# Patient Record
Sex: MALE | Race: WHITE | NOT HISPANIC OR LATINO | Employment: FULL TIME | URBAN - METROPOLITAN AREA
[De-identification: names, ages, dates, MRNs, and addresses within clinical notes are randomized per-mention and may not be internally consistent; named-entity substitution may affect disease eponyms.]

---
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
## 2023-04-28 ENCOUNTER — HOSPITAL ENCOUNTER (EMERGENCY)
Facility: CLINIC | Age: 18
Discharge: HOME | End: 2023-04-28
Attending: EMERGENCY MEDICINE | Admitting: EMERGENCY MEDICINE
Payer: COMMERCIAL

## 2023-04-28 VITALS
TEMPERATURE: 97.9 F | OXYGEN SATURATION: 99 % | WEIGHT: 229 LBS | SYSTOLIC BLOOD PRESSURE: 150 MMHG | DIASTOLIC BLOOD PRESSURE: 77 MMHG | HEART RATE: 64 BPM | BODY MASS INDEX: 31.06 KG/M2 | RESPIRATION RATE: 18 BRPM

## 2023-04-28 DIAGNOSIS — K08.89 PAIN, DENTAL: ICD-10-CM

## 2023-04-28 PROCEDURE — 99284 EMERGENCY DEPT VISIT MOD MDM: CPT

## 2023-04-28 RX ORDER — IBUPROFEN 800 MG/1
800 TABLET, FILM COATED ORAL EVERY 8 HOURS PRN
Qty: 24 TABLET | Refills: 0 | Status: SHIPPED | OUTPATIENT
Start: 2023-04-28 | End: 2023-05-06

## 2023-04-28 RX ORDER — HYDROCODONE BITARTRATE AND ACETAMINOPHEN 5; 325 MG/1; MG/1
1 TABLET ORAL EVERY 6 HOURS PRN
Qty: 10 TABLET | Refills: 0 | Status: SHIPPED | OUTPATIENT
Start: 2023-04-28 | End: 2023-05-01

## 2023-04-28 RX ORDER — CLINDAMYCIN HCL 300 MG
300 CAPSULE ORAL 3 TIMES DAILY
Qty: 30 CAPSULE | Refills: 0 | Status: SHIPPED | OUTPATIENT
Start: 2023-04-28 | End: 2023-05-08

## 2023-04-28 NOTE — ED PROVIDER NOTES
History     Chief Complaint:  Dental Pain       HPI   Rafita Kramer is a 17 year old male with a history of wisdom teeth pain who presents with dental pain    Patient is a 17-year-old male with a history of wisdom teeth pain.  Patient is a seen by had patient to oral surgery and is scheduled initially for tooth extraction on 23 May.  Patient called them back and had worsening pain has an open area in the left lower mandible and was recommended we change in the date to May 3.  Patient continues to have pain is taking ibuprofen 400 every 12 hours as well as Tylenol pain has been difficult and was recommended to come to the emergency room for antibiotics.  .      Independent Historian:       Review of External Notes:      ROS:  Review of Systems    Allergies:  Cashew Nut Oil     Medications:    No current outpatient medications on file.      Past Medical History:    Past Medical History:   Diagnosis Date     Anaphylaxis        Past Surgical History:    No past surgical history on file.     Family History:    family history is not on file.    Social History:   reports that he has never smoked. He has been exposed to tobacco smoke. He has never used smokeless tobacco.  PCP: Rocky Stokes     Physical Exam     Patient Vitals for the past 24 hrs:   BP Temp Temp src Pulse Resp SpO2 Weight   04/28/23 1529 (!) 150/77 97.9  F (36.6  C) Temporal 64 18 99 % 103.9 kg (229 lb)        Physical Exam  Vitals reviewed.   HENT:      Head: Normocephalic.      Right Ear: Tympanic membrane normal.      Left Ear: Tympanic membrane normal.      Nose: Nose normal.      Mouth/Throat:      Mouth: Mucous membranes are moist.      Comments: Dentition: Patient has a open to carry in the base of the left molar.  There is no gingival abscess.  There is tenderness and tap tenderness over the left lower molar.  No facial swelling.  Neurological:      Mental Status: He is alert.           Emergency Department Course     Laboratory:  Labs  Ordered and Resulted from Time of ED Arrival to Time of ED Departure - No data to display         Emergency Department Course & Assessments:             Interventions:  Medications - No data to display       Social Determinants of Health affecting care:  none       Assessments:      Disposition:  The patient was discharged to home.     Impression & Plan        Medical Decision Making:  Patient is a well-appearing 17-year-old male with left lower dental pain.  Examination is consistent with Dental caries as well as dental wisdom tooth pain.  Did consider dental block but due to patient's scheduled appointment on the third lack of acute interventions in the emergency room offered empiric pain medication and antibiotics as a bridge to follow-up with oral surgery.  Patient was discharged home in stable condition.    Critical Care time:  was 0 minutes for this patient excluding procedures.    Diagnosis:    ICD-10-CM    1. Pain, dental  K08.89            Discharge Medications:  Discharge Medication List as of 4/28/2023  4:14 PM      START taking these medications    Details   clindamycin (CLEOCIN) 300 MG capsule Take 1 capsule (300 mg) by mouth 3 times daily for 10 days, Disp-30 capsule, R-0, Local Print      HYDROcodone-acetaminophen (NORCO) 5-325 MG tablet Take 1 tablet by mouth every 6 hours as needed for severe pain, Disp-10 tablet, R-0, Local Print      ibuprofen (ADVIL/MOTRIN) 800 MG tablet Take 1 tablet (800 mg) by mouth every 8 hours as needed for moderate pain, Disp-24 tablet, R-0, Local Print                Rocky Dobbs MD    4/28/2023   Rocky Dobbs MD Goodman, Brian Samuel, MD  04/29/23 1018

## 2023-04-28 NOTE — DISCHARGE INSTRUCTIONS
The emergency room is limited in its ability to help with dental pain.  Continue ibuprofen 800 mg every 6 hours.  Use pain medication when needed start clindamycin as antibiotic.  Please follow-up May 3 for dental extraction and wisdom teeth care through your oral surgeon.  Thanks for your patience today.

## 2023-04-28 NOTE — ED TRIAGE NOTES
Lower left wisdom tooth pain x3 weeks. Denies fevers, chills. Seen by dentist and told to come to ED for abx.      Triage Assessment     Row Name 04/28/23 0894       Triage Assessment (Adult)    Airway WDL WDL       Respiratory WDL    Respiratory WDL WDL       Skin Circulation/Temperature WDL    Skin Circulation/Temperature WDL WDL       Cardiac WDL    Cardiac WDL WDL       Peripheral/Neurovascular WDL    Peripheral Neurovascular WDL WDL       Cognitive/Neuro/Behavioral WDL    Cognitive/Neuro/Behavioral WDL WDL

## 2023-06-09 ENCOUNTER — OFFICE VISIT (OUTPATIENT)
Dept: FAMILY MEDICINE | Facility: CLINIC | Age: 18
End: 2023-06-09

## 2023-06-09 VITALS
RESPIRATION RATE: 20 BRPM | OXYGEN SATURATION: 98 % | DIASTOLIC BLOOD PRESSURE: 96 MMHG | HEART RATE: 87 BPM | WEIGHT: 225 LBS | TEMPERATURE: 97.9 F | SYSTOLIC BLOOD PRESSURE: 142 MMHG | BODY MASS INDEX: 30.52 KG/M2

## 2023-06-09 DIAGNOSIS — R03.0 ELEVATED BP WITHOUT DIAGNOSIS OF HYPERTENSION: ICD-10-CM

## 2023-06-09 DIAGNOSIS — F43.10 POSTTRAUMATIC STRESS DISORDER: ICD-10-CM

## 2023-06-09 DIAGNOSIS — R41.840 DIFFICULTY CONCENTRATING: Primary | ICD-10-CM

## 2023-06-09 DIAGNOSIS — F14.90 COCAINE USE: ICD-10-CM

## 2023-06-09 DIAGNOSIS — F39 MOOD DISORDER (H): ICD-10-CM

## 2023-06-09 PROCEDURE — 99213 OFFICE O/P EST LOW 20 MIN: CPT | Performed by: STUDENT IN AN ORGANIZED HEALTH CARE EDUCATION/TRAINING PROGRAM

## 2023-06-09 RX ORDER — HYDROCODONE BITARTRATE AND ACETAMINOPHEN 5; 325 MG/1; MG/1
1 TABLET ORAL EVERY 6 HOURS PRN
COMMUNITY
Start: 2023-05-03 | End: 2023-06-09

## 2023-06-09 RX ORDER — CHLORHEXIDINE GLUCONATE ORAL RINSE 1.2 MG/ML
SOLUTION DENTAL
COMMUNITY
Start: 2023-05-03 | End: 2023-06-09

## 2023-06-09 RX ORDER — IBUPROFEN 600 MG/1
TABLET, FILM COATED ORAL
COMMUNITY
Start: 2023-05-03 | End: 2023-06-09

## 2023-06-09 NOTE — NURSING NOTE
Chief Complaint   Patient presents with     Consult For     Wants to start adhd medication again, was taking this in the past, has had the testing done for this at [facility], was taking adderall 10 mg and vyvanse but did not like vyvanse at all     Pre-visit Screening:  Immunizations:  Is not up to date-due for tetanus shot   Colonoscopy:  na  Mammogram: na  Asthma Action Test/Plan:  na  PHQ9:  PHQ2 done today   GAD7:  NA  Questioned patient about current smoking habits Pt. Currently smokes cigars.  Ok to leave detailed message on voice mail for today's visit only yes, phone # [phone]

## 2023-06-09 NOTE — LETTER
[facility]          [address], [address]  [place], [place] [address]  [phone]  Fax [phone]    06/09/23    Patient: Rafita Kramer  YOB: 2005  Medical Record Number: [id number]                                                Controlled Substance Agreement  I understand that my care provider has prescribed controlled substances (narcotics, tranquilizers, and/or stimulants) to help manage my condition(s).  I am taking this medicine to help me function or work.  I know that this is strong medicine.  It could have serious side effects and even cause a dependency on the drug.  If I stop these medicines suddenly, I could have severe withdrawal symptoms.    The risks, benefits, and side effects of these medication(s) were explained to me.  I agree that:  I will take part in other treatments as advised by my provider.  This may be psychiatry or counseling, physical therapy, behavioral therapy, group treatment, or a referral to a pain clinic.  I will reduce or stop my medicine when my provider tells me to do so.   I will take my medicines as prescribed.  I will not change the dose or schedule unless my provider tells me to.  There will be no refills if I  run out early.   I may be contacted at any time without warning and asked to complete a drug test or pill count.   I will keep all my appointments at the clinic.  If I miss appointments or fail to follow instructions, my provider may stop my medicine.  I will not ask other providers to prescribe controlled substances. And I will not accept controlled substances from other people. If I need another prescribed controlled substance for a new reason, I will notify my provider within one business day.  If I enroll in the [place] Medical Marijuana program, I will tell my provider.  I will also sign an agreement to share my medical records with my provider.  I will use one pharmacy to fill all of my controlled substance  prescriptions.  If my prescription is mailed to my pharmacy, it may take 5 to 7 days for my medicine to be ready.  I understand that my provider, clinic care team, and pharmacy can track controlled substance prescriptions from other providers through a central database (prescription monitoring program).  I will bring in my list of medications (or my medicine bottles) each time I come to the clinic.  REV-  04/2016                                                                                                               Page  1 of 2    [facility]      06/09/23    Patient: Rafita Kramer  YOB: 2005  Medical Record Number: [id number]    Refills of controlled substances will be made only during office hours.  It is up to me to make sure that I do not run out of my medicines on weekends or holidays.    I am responsible for my prescriptions.  If the medicine is lost or stolen, it will not be replaced.   I also agree not to share these medicines with anyone.  I agree to not use ANY illegal or recreational drugs.  This includes marijuana, cocaine, bath salts or other drugs.  I agree not to use alcohol unless my provider says I may.  I agree to give urine samples whenever asked.  If I fail to give a urine sample, the provider may stop my medicine.     I will tell my nurse or provider right away if I become pregnant or have a new medical problem treated outside of [facility].  I understand that this medicine can affect my thinking and judgment.  It may be unsafe for me to drive, use machinery and do dangerous tasks.  I will not do any of these things until I know how the medicine affects me.  If my dose changes, I will wait to see how it affects me.  I will contact my provider if I have concerns about medicine side effects.  I understand that if I do not follow any of the conditions above, my prescriptions or treatment may be stopped.    I agree that my provider, clinic care team, and  pharmacy may work with any city, state or federal law enforcement agency that investigates the misuse, sale, or other diversion of my controlled medicine. I will allow my provider to discuss my care with or share a copy of this agreement with any other treating provider, pharmacy or emergency room where I receive care.  I agree to give up (waive) any right of privacy or confidentiality with respect to these authorizations.   I have read this agreement and have asked questions about anything I did not understand.   ___________________________________    ___________________________  Patient Signature                                                           Date and Time  ___________________________________     ____________________________  Witness                                                                            Date and Time  ___________________________________  NICOLE DO MD  REV-  04/2016                                                                                                                                                                 Page 2 of 2

## 2023-06-09 NOTE — PROGRESS NOTES
Assessment & Plan       ICD-10-CM    1. Difficulty concentrating  R41.840       2. Mood disorder (H)  F39       3. Posttraumatic stress disorder  F43.10       4. Cocaine use  F14.90       5. Elevated BP without diagnosis of hypertension  R03.0          Hx of MDD/LIAM, PTSD currently untreated  [facility] for ADHD eval records completed   Signed CSA  At end of visit admits to recent cocaine use, 2 days ago. Will review psychiatric records when available, tentatively favor re-establishing with Psychiatry vs non-stimulant tx options.     Reasons to follow-up sooner or seek emergent care reviewed.       Rocky Stokes MD, [facility]       Subjective     Rafita Kramer is a 17 year old male who presents to clinic today for the following health issues:    HPI   Chief Complaint   Patient presents with     Consult For     Wants to start adhd medication again, was taking this in the past, has had the testing done for this at [employer], was taking adderall 10 mg and vyvanse but did not like vyvanse at all      Working 12 hr days, 6d/wk  Stopped prozac and no longer seeing therapist - doesn't see any need.  Sleep is variable, no longer using THC.       Objective    BP (!) 142/96 (BP Location: Left arm, Patient Position: Sitting, Cuff Size: Adult Large)   Pulse 87   Temp 97.9  F (36.6  C) (Temporal)   Resp 20   Wt 102.1 kg (225 lb)   SpO2 98%   BMI 30.52 kg/m    Body mass index is 30.52 kg/m .  Alert, NAD  NC/AT  Sclerae anicteric  Regular  Resp nonlabored  Skin warm and dry  No focal neuro deficits. Speech intact.   Appropriate affect, limited insight  Normal gait.    Labs reviewed.

## 2023-06-30 ENCOUNTER — TRANSFERRED RECORDS (OUTPATIENT)
Dept: FAMILY MEDICINE | Facility: CLINIC | Age: 18
End: 2023-06-30

## 2023-07-28 ENCOUNTER — TELEPHONE (OUTPATIENT)
Dept: FAMILY MEDICINE | Facility: CLINIC | Age: 18
End: 2023-07-28

## 2023-11-01 ENCOUNTER — TRANSFERRED RECORDS (OUTPATIENT)
Dept: FAMILY MEDICINE | Facility: CLINIC | Age: 18
End: 2023-11-01

## 2024-06-17 PROBLEM — Z76.89 HEALTH CARE HOME: Status: RESOLVED | Noted: 2021-10-19 | Resolved: 2024-06-17
